# Patient Record
Sex: FEMALE
[De-identification: names, ages, dates, MRNs, and addresses within clinical notes are randomized per-mention and may not be internally consistent; named-entity substitution may affect disease eponyms.]

---

## 2024-09-13 ENCOUNTER — P2P PATIENT RECORD (OUTPATIENT)
Age: 44
End: 2024-09-13

## 2024-09-17 ENCOUNTER — TELEPHONE ENCOUNTER (OUTPATIENT)
Dept: URBAN - METROPOLITAN AREA CLINIC 60 | Facility: CLINIC | Age: 44
End: 2024-09-17

## 2024-09-26 ENCOUNTER — DASHBOARD ENCOUNTERS (OUTPATIENT)
Age: 44
End: 2024-09-26

## 2024-10-02 ENCOUNTER — LAB OUTSIDE AN ENCOUNTER (OUTPATIENT)
Dept: URBAN - METROPOLITAN AREA CLINIC 60 | Facility: CLINIC | Age: 44
End: 2024-10-02

## 2024-10-02 ENCOUNTER — OFFICE VISIT (OUTPATIENT)
Dept: URBAN - METROPOLITAN AREA CLINIC 60 | Facility: CLINIC | Age: 44
End: 2024-10-02
Payer: COMMERCIAL

## 2024-10-02 VITALS
RESPIRATION RATE: 12 BRPM | SYSTOLIC BLOOD PRESSURE: 140 MMHG | HEIGHT: 67 IN | OXYGEN SATURATION: 97 % | BODY MASS INDEX: 36.26 KG/M2 | TEMPERATURE: 98.7 F | WEIGHT: 231 LBS | DIASTOLIC BLOOD PRESSURE: 78 MMHG | HEART RATE: 126 BPM

## 2024-10-02 DIAGNOSIS — R79.89 ELEVATED LFTS: ICD-10-CM

## 2024-10-02 DIAGNOSIS — R19.7 ACUTE DIARRHEA: ICD-10-CM

## 2024-10-02 PROCEDURE — 99204 OFFICE O/P NEW MOD 45 MIN: CPT

## 2024-10-02 RX ORDER — HYDROCORTISONE 25 MG/G
APPLY TO AFFECTED AREA TWICE A DAY FOR 3 DAYS AS NEEDED CREAM TOPICAL
Qty: 28.35 GRAM | Refills: 0 | Status: ACTIVE | COMMUNITY

## 2024-10-02 RX ORDER — LISDEXAMFETAMINE DIMESYLATE 30 MG/1
TAKE 1 CAPSULE BY MOUTH EVERY DAY IN THE MORNING CAPSULE ORAL
Qty: 30 EACH | Refills: 0 | Status: ACTIVE | COMMUNITY

## 2024-10-02 RX ORDER — FLUOXETINE 20 MG/1
TAKE 1 CAPSULE BY MOUTH EVERY DAY IN THE MORNING FOR 90 DAYS CAPSULE ORAL
Qty: 90 EACH | Refills: 0 | Status: ACTIVE | COMMUNITY

## 2024-10-02 RX ORDER — GABAPENTIN 300 MG/1
TAKE 2 CAPSULES BY MOUTH AT BEDTIME CAPSULE ORAL
Qty: 60 EACH | Refills: 0 | Status: ACTIVE | COMMUNITY

## 2024-10-02 RX ORDER — NORETHINDRONE ACETATE AND ETHINYL ESTRADIOL 1; .02 MG/1; MG/1
TAKE 1 TABLET BY MOUTH EVERY DAY TABLET ORAL
Qty: 21 EACH | Refills: 0 | Status: ACTIVE | COMMUNITY

## 2024-10-02 RX ORDER — FERROUS SULFATE TAB EC 325 MG (65 MG FE EQUIVALENT) 325 (65 FE) MG
1 TABLET TABLET DELAYED RESPONSE ORAL
Qty: 12 | Status: ACTIVE | COMMUNITY
Start: 2024-10-02

## 2024-10-02 RX ORDER — ARIPIPRAZOLE 5 MG/1
TAKE 1 TABLET BY MOUTH EVERY DAY IN THE MORNING TABLET ORAL
Qty: 90 EACH | Refills: 0 | Status: ACTIVE | COMMUNITY

## 2024-10-02 RX ORDER — ROSUVASTATIN CALCIUM 20 MG/1
TAKE 1 TABLET BY MOUTH EVERYDAY AT BEDTIME TABLET, FILM COATED ORAL
Qty: 90 EACH | Refills: 1 | Status: ACTIVE | COMMUNITY

## 2024-10-02 RX ORDER — METOPROLOL SUCCINATE 50 MG/1
TAKE 1 TABLET BY MOUTH EVERY DAY TABLET, EXTENDED RELEASE ORAL
Qty: 90 EACH | Refills: 2 | Status: ACTIVE | COMMUNITY

## 2024-10-02 NOTE — HPI-TODAY'S VISIT:
Patient is a 44-year-old female who presents today as a new patient with complaints of diarrhea.  Patient states that for the last 6 weeks she has been experiencing daily diarrhea symptoms.  She states that when she wakes up she does have a formed bowel movement however throughout the day she has several bowel movements of watery diarrhea.  She states that she takes Imodium as needed with good symptom relief.  She denies abdominal pain, constipation, hematochezia and melena.  She did have some stool studies done through her PCP and the available results have been unremarkable so far.  Patient also states that she started Ozempic for weight loss and began experiencing acid reflux.  She ultimately discontinued the medication and her acid reflux has resolved.  On review of patient's most recent labs she did have elevated LFTs that seem to be trending downward.  She had an abdominal ultrasound done through her PCP which showed some fatty liver.  She has never had a colonoscopy before and denies known family history of colorectal cancer.  Patient denies fever, dysphagia, acid reflux, change in appetite, nausea, vomiting, hematemesis, change in stool frequency/caliber, unintentional weight loss/gain. . Labs 9/25/2024 - Lipid panel: HDL 45, triglycerides 212 otherwise unremarkable - CMP: AST 32 otherwise unremarkable - Hemoglobin A1c 5.8 - GGT 49 - CBCD within normal limits - Urinalysis within normal limits - Vitamin B12 and folate within normal limits - Hepatitis A antibody IgM nonreactive - Hepatitis B core antibody IgM nonreactive - Hepatitis C antibody nonreactive - Hepatitis B surface antigen nonreactive . Stool studies 9/25/2024 - C. difficile not detected - Aim for Bactrim as detected - Shiga toxin not detected - Salmonella and Shigella culture pending - O&& pending . Labs 8/12/2024 - TSH and free T4 within normal limits - CMP: AST 38, ALT 46 otherwise unremarkable - Hemoglobin A1c 5.7 - Vitamin D within normal limits . Labs 3/6/2024 - Lipid panel: HDL 40, triglycerides 184 otherwise unremarkable - CMP within normal limits - Vitamin B12 and folate within normal limits - CBCD: WBC 10.1, absolute lymphocytes 4.3 otherwise unremarkable

## 2024-11-13 ENCOUNTER — OFFICE VISIT (OUTPATIENT)
Dept: URBAN - METROPOLITAN AREA CLINIC 60 | Facility: CLINIC | Age: 44
End: 2024-11-13

## 2024-12-03 PROBLEM — 197321007: Status: ACTIVE | Noted: 2024-12-03

## 2024-12-04 ENCOUNTER — LAB OUTSIDE AN ENCOUNTER (OUTPATIENT)
Dept: URBAN - METROPOLITAN AREA CLINIC 60 | Facility: CLINIC | Age: 44
End: 2024-12-04

## 2024-12-04 ENCOUNTER — OFFICE VISIT (OUTPATIENT)
Dept: URBAN - METROPOLITAN AREA CLINIC 60 | Facility: CLINIC | Age: 44
End: 2024-12-04
Payer: COMMERCIAL

## 2024-12-04 VITALS
TEMPERATURE: 98.8 F | HEIGHT: 67 IN | OXYGEN SATURATION: 98 % | RESPIRATION RATE: 12 BRPM | SYSTOLIC BLOOD PRESSURE: 132 MMHG | WEIGHT: 239.4 LBS | HEART RATE: 112 BPM | BODY MASS INDEX: 37.57 KG/M2 | DIASTOLIC BLOOD PRESSURE: 80 MMHG

## 2024-12-04 DIAGNOSIS — K76.0 HEPATIC STEATOSIS: ICD-10-CM

## 2024-12-04 DIAGNOSIS — K52.9 CHRONIC DIARRHEA: ICD-10-CM

## 2024-12-04 PROCEDURE — 99214 OFFICE O/P EST MOD 30 MIN: CPT

## 2024-12-04 RX ORDER — METOPROLOL SUCCINATE 50 MG/1
TAKE 1 TABLET BY MOUTH EVERY DAY TABLET, EXTENDED RELEASE ORAL
Qty: 90 EACH | Refills: 2 | Status: ACTIVE | COMMUNITY

## 2024-12-04 RX ORDER — ROSUVASTATIN CALCIUM 20 MG/1
TAKE 1 TABLET BY MOUTH EVERYDAY AT BEDTIME TABLET, FILM COATED ORAL
Qty: 90 EACH | Refills: 1 | Status: ACTIVE | COMMUNITY

## 2024-12-04 RX ORDER — FLUOXETINE 20 MG/1
TAKE 1 CAPSULE BY MOUTH EVERY DAY IN THE MORNING FOR 90 DAYS CAPSULE ORAL
Qty: 90 EACH | Refills: 0 | Status: ACTIVE | COMMUNITY

## 2024-12-04 RX ORDER — ARIPIPRAZOLE 5 MG/1
TAKE 1 TABLET BY MOUTH EVERY DAY IN THE MORNING TABLET ORAL
Qty: 90 EACH | Refills: 0 | Status: ACTIVE | COMMUNITY

## 2024-12-04 RX ORDER — FERROUS SULFATE TAB EC 325 MG (65 MG FE EQUIVALENT) 325 (65 FE) MG
1 TABLET TABLET DELAYED RESPONSE ORAL
Qty: 12 | Status: ACTIVE | COMMUNITY
Start: 2024-10-02

## 2024-12-04 RX ORDER — HYDROCORTISONE 25 MG/G
APPLY TO AFFECTED AREA TWICE A DAY FOR 3 DAYS AS NEEDED CREAM TOPICAL
Qty: 28.35 GRAM | Refills: 0 | Status: ACTIVE | COMMUNITY

## 2024-12-04 RX ORDER — GABAPENTIN 300 MG/1
TAKE 2 CAPSULES BY MOUTH AT BEDTIME CAPSULE ORAL
Qty: 60 EACH | Refills: 0 | Status: ACTIVE | COMMUNITY

## 2024-12-04 RX ORDER — LISDEXAMFETAMINE DIMESYLATE 30 MG/1
TAKE 1 CAPSULE BY MOUTH EVERY DAY IN THE MORNING CAPSULE ORAL
Qty: 30 EACH | Refills: 0 | Status: ACTIVE | COMMUNITY

## 2024-12-04 RX ORDER — NORETHINDRONE ACETATE AND ETHINYL ESTRADIOL 1; .02 MG/1; MG/1
TAKE 1 TABLET BY MOUTH EVERY DAY TABLET ORAL
Qty: 21 EACH | Refills: 0 | Status: ACTIVE | COMMUNITY

## 2024-12-04 NOTE — HPI-TODAY'S VISIT:
Patient is a 44-year-old female who presents today for follow-up on labs and FibroScan results.  At today's visit patient states that her diarrhea symptoms are slightly better after starting Benefiber however sometimes she will still have watery diarrhea.  She is currently taking 3 teaspoons of Benefiber daily.  Reviewed stool studies with patient and discussed colonoscopy procedure to evaluate for microscopic colitis.  She does have a family history of colon cancer in her maternal grandmother.  Also discussed starting cholestyramine however patient would like to try increasing daily Benefiber and waiting for colonoscopy results before starting medication.  Also reviewed FibroScan and labs due to elevated LFTs which showed severe steatosis.  Discussed continued monitoring annually with repeat labs and FibroScan.  Patient denies fever, dysphagia, acid reflux, change in appetite, nausea, vomiting, hematemesis, change in stool frequency/caliber, unintentional weight loss/gain. She also denies issues with anesthesia, history of stroke, heart attack, pacemaker, defibrillator, stents, blood thinners, COPD, asthma, DREW, chronic kidney disease and seizures. . FibroScan 11/13/2024 - Severe steatosis (S3) - No evidence of clinically significant fibrosis (F0) . Stool studies 11/6/2024 - Pancreatic elastase within normal limits - Fecal calprotectin within normal limits . Labs 10/31/2024 - Iron panel within normal limits - Ceruloplasmin within normal limits - AFP within normal limits - Ferritin within normal limits - Alpha 1 antitrypsin normal - JOHN negative - Hemochromatosis negative . Labs 9/25/2024 - Lipid panel: HDL 45, triglycerides 212 otherwise unremarkable - CMP: AST 32 otherwise unremarkable - Hemoglobin A1c 5.8 - GGT 49 - CBCD within normal limits - Urinalysis within normal limits - Vitamin B12 and folate within normal limits - Hepatitis A antibody IgM nonreactive - Hepatitis B core antibody IgM nonreactive - Hepatitis C antibody nonreactive - Hepatitis B surface antigen nonreactive . Stool studies 9/25/2024 - C. difficile not detected - Aim for Bactrim as detected - Shiga toxin not detected - Salmonella and Shigella culture pending - O&P pending . Labs 8/12/2024 - TSH and free T4 within normal limits - CMP: AST 38, ALT 46 otherwise unremarkable - Hemoglobin A1c 5.7 - Vitamin D within normal limits . Labs 3/6/2024 - Lipid panel: HDL 40, triglycerides 184 otherwise unremarkable - CMP within normal limits - Vitamin B12 and folate within normal limits - CBCD: WBC 10.1, absolute lymphocytes 4.3 otherwise unremarkable

## 2025-02-21 ENCOUNTER — TELEPHONE ENCOUNTER (OUTPATIENT)
Dept: URBAN - METROPOLITAN AREA CLINIC 63 | Facility: CLINIC | Age: 45
End: 2025-02-21

## 2025-03-06 ENCOUNTER — OFFICE VISIT (OUTPATIENT)
Dept: URBAN - METROPOLITAN AREA SURGERY CENTER 4 | Facility: SURGERY CENTER | Age: 45
End: 2025-03-06
Payer: COMMERCIAL

## 2025-03-06 ENCOUNTER — CLAIMS CREATED FROM THE CLAIM WINDOW (OUTPATIENT)
Dept: URBAN - METROPOLITAN AREA CLINIC 4 | Facility: CLINIC | Age: 45
End: 2025-03-06
Payer: COMMERCIAL

## 2025-03-06 DIAGNOSIS — K52.9 NONINFECTIOUS GASTROENTERITIS, UNSPECIFIED TYPE: ICD-10-CM

## 2025-03-06 DIAGNOSIS — R19.7 DIARRHEA, UNSPECIFIED TYPE: ICD-10-CM

## 2025-03-06 DIAGNOSIS — K64.0 FIRST DEGREE HEMORRHOIDS: ICD-10-CM

## 2025-03-06 DIAGNOSIS — K63.89 OTHER SPECIFIED DISEASES OF INTESTINE: ICD-10-CM

## 2025-03-06 PROCEDURE — 45380 COLONOSCOPY AND BIOPSY: CPT | Performed by: INTERNAL MEDICINE

## 2025-03-06 PROCEDURE — 88313 SPECIAL STAINS GROUP 2: CPT | Performed by: PATHOLOGY

## 2025-03-06 PROCEDURE — 88305 TISSUE EXAM BY PATHOLOGIST: CPT | Performed by: PATHOLOGY

## 2025-03-06 PROCEDURE — 88342 IMHCHEM/IMCYTCHM 1ST ANTB: CPT | Performed by: PATHOLOGY

## 2025-03-06 PROCEDURE — 00811 ANES LWR INTST NDSC NOS: CPT | Performed by: NURSE ANESTHETIST, CERTIFIED REGISTERED

## 2025-03-06 RX ORDER — METOPROLOL SUCCINATE 50 MG/1
TAKE 1 TABLET BY MOUTH EVERY DAY TABLET, EXTENDED RELEASE ORAL
Qty: 90 EACH | Refills: 2 | Status: ACTIVE | COMMUNITY

## 2025-03-06 RX ORDER — HYDROCORTISONE 25 MG/G
APPLY TO AFFECTED AREA TWICE A DAY FOR 3 DAYS AS NEEDED CREAM TOPICAL
Qty: 28.35 GRAM | Refills: 0 | Status: ACTIVE | COMMUNITY

## 2025-03-06 RX ORDER — LISDEXAMFETAMINE DIMESYLATE 30 MG/1
TAKE 1 CAPSULE BY MOUTH EVERY DAY IN THE MORNING CAPSULE ORAL
Qty: 30 EACH | Refills: 0 | Status: ACTIVE | COMMUNITY

## 2025-03-06 RX ORDER — ARIPIPRAZOLE 5 MG/1
TAKE 1 TABLET BY MOUTH EVERY DAY IN THE MORNING TABLET ORAL
Qty: 90 EACH | Refills: 0 | Status: ACTIVE | COMMUNITY

## 2025-03-06 RX ORDER — NORETHINDRONE ACETATE AND ETHINYL ESTRADIOL 1; .02 MG/1; MG/1
TAKE 1 TABLET BY MOUTH EVERY DAY TABLET ORAL
Qty: 21 EACH | Refills: 0 | Status: ACTIVE | COMMUNITY

## 2025-03-06 RX ORDER — FLUOXETINE 20 MG/1
TAKE 1 CAPSULE BY MOUTH EVERY DAY IN THE MORNING FOR 90 DAYS CAPSULE ORAL
Qty: 90 EACH | Refills: 0 | Status: ACTIVE | COMMUNITY

## 2025-03-06 RX ORDER — ROSUVASTATIN CALCIUM 20 MG/1
TAKE 1 TABLET BY MOUTH EVERYDAY AT BEDTIME TABLET, FILM COATED ORAL
Qty: 90 EACH | Refills: 1 | Status: ACTIVE | COMMUNITY

## 2025-03-06 RX ORDER — GABAPENTIN 300 MG/1
TAKE 2 CAPSULES BY MOUTH AT BEDTIME CAPSULE ORAL
Qty: 60 EACH | Refills: 0 | Status: ACTIVE | COMMUNITY

## 2025-03-06 RX ORDER — FERROUS SULFATE TAB EC 325 MG (65 MG FE EQUIVALENT) 325 (65 FE) MG
1 TABLET TABLET DELAYED RESPONSE ORAL
Qty: 12 | Status: ACTIVE | COMMUNITY
Start: 2024-10-02

## 2025-03-13 ENCOUNTER — TELEPHONE ENCOUNTER (OUTPATIENT)
Dept: URBAN - METROPOLITAN AREA CLINIC 63 | Facility: CLINIC | Age: 45
End: 2025-03-13

## 2025-03-14 ENCOUNTER — OFFICE VISIT (OUTPATIENT)
Dept: URBAN - METROPOLITAN AREA CLINIC 60 | Facility: CLINIC | Age: 45
End: 2025-03-14
Payer: COMMERCIAL

## 2025-03-14 VITALS
HEART RATE: 93 BPM | DIASTOLIC BLOOD PRESSURE: 90 MMHG | BODY MASS INDEX: 39.08 KG/M2 | OXYGEN SATURATION: 97 % | TEMPERATURE: 98.7 F | WEIGHT: 249 LBS | SYSTOLIC BLOOD PRESSURE: 130 MMHG | HEIGHT: 67 IN

## 2025-03-14 DIAGNOSIS — K76.0 HEPATIC STEATOSIS: ICD-10-CM

## 2025-03-14 DIAGNOSIS — K52.9 CHRONIC DIARRHEA: ICD-10-CM

## 2025-03-14 PROCEDURE — 99214 OFFICE O/P EST MOD 30 MIN: CPT

## 2025-03-14 RX ORDER — METOPROLOL SUCCINATE 50 MG/1
TAKE 1 TABLET BY MOUTH EVERY DAY TABLET, EXTENDED RELEASE ORAL
Qty: 90 EACH | Refills: 2 | Status: ACTIVE | COMMUNITY

## 2025-03-14 RX ORDER — ROSUVASTATIN CALCIUM 20 MG/1
TAKE 1 TABLET BY MOUTH EVERYDAY AT BEDTIME TABLET, FILM COATED ORAL
Qty: 90 EACH | Refills: 1 | Status: ACTIVE | COMMUNITY

## 2025-03-14 RX ORDER — HYDROCORTISONE 25 MG/G
APPLY TO AFFECTED AREA TWICE A DAY FOR 3 DAYS AS NEEDED CREAM TOPICAL
Qty: 28.35 GRAM | Refills: 0 | Status: ACTIVE | COMMUNITY

## 2025-03-14 RX ORDER — CHOLESTYRAMINE 4 G/9G
1 PACKET MIXED WITH WATER OR NON-CARBONATED DRINK POWDER, FOR SUSPENSION ORAL ONCE A DAY
Qty: 30 | Refills: 1 | OUTPATIENT
Start: 2025-03-14

## 2025-03-14 RX ORDER — LISDEXAMFETAMINE DIMESYLATE 30 MG/1
TAKE 1 CAPSULE BY MOUTH EVERY DAY IN THE MORNING CAPSULE ORAL
Qty: 30 EACH | Refills: 0 | Status: ACTIVE | COMMUNITY

## 2025-03-14 RX ORDER — NORETHINDRONE ACETATE AND ETHINYL ESTRADIOL 1; .02 MG/1; MG/1
TAKE 1 TABLET BY MOUTH EVERY DAY TABLET ORAL
Qty: 21 EACH | Refills: 0 | Status: ACTIVE | COMMUNITY

## 2025-03-14 RX ORDER — GABAPENTIN 300 MG/1
TAKE 2 CAPSULES BY MOUTH AT BEDTIME CAPSULE ORAL
Qty: 60 EACH | Refills: 0 | Status: ACTIVE | COMMUNITY

## 2025-03-14 RX ORDER — FERROUS SULFATE TAB EC 325 MG (65 MG FE EQUIVALENT) 325 (65 FE) MG
1 TABLET TABLET DELAYED RESPONSE ORAL
Qty: 12 | Status: ACTIVE | COMMUNITY
Start: 2024-10-02

## 2025-03-14 RX ORDER — ARIPIPRAZOLE 5 MG/1
TAKE 1 TABLET BY MOUTH EVERY DAY IN THE MORNING TABLET ORAL
Qty: 90 EACH | Refills: 0 | Status: ACTIVE | COMMUNITY

## 2025-03-14 RX ORDER — FLUOXETINE 20 MG/1
TAKE 1 CAPSULE BY MOUTH EVERY DAY IN THE MORNING FOR 90 DAYS CAPSULE ORAL
Qty: 90 EACH | Refills: 0 | Status: ACTIVE | COMMUNITY

## 2025-03-14 NOTE — HPI-TODAY'S VISIT:
Patient is a 44-year-old female with a past medical history of hepatic steatosis who presents today for review of colonoscopy results.  At today's visit patient states that her diarrhea symptoms have improved with increasing Benefiber to 3 teaspoons twice a day.  She does still have soft bowel movements and has to have 2-3 bowel movements daily but typically the bowel movement in the morning is more formed.  She has not experienced any abdominal bloating with this.  Reviewed colonoscopy results.  Discussed with patient cholestyramine trial which she is open to. Patient denies fever, dysphagia, acid reflux, change in appetite, nausea, vomiting, hematemesis, change in stool frequency/caliber, unintentional weight loss/gain. . Colonoscopy 3/6/2025 - Internal hemorrhoids - Examination was otherwise normal - Examined portion of ileum was normal, biopsied - Biopsies taken from entire colon for evaluation of microscopic colitis - Pathology: Terminal ileum biopsy-no significant abnormality; random colon biopsy-no significant abnormality - Repeat colonoscopy in 10 years . FibroScan 11/13/2024 - Severe steatosis (S3) - No evidence of clinically significant fibrosis (F0) . Stool studies 11/6/2024 - Pancreatic elastase within normal limits - Fecal calprotectin within normal limits . Labs 10/31/2024 - Iron panel within normal limits - Ceruloplasmin within normal limits - AFP within normal limits - Ferritin within normal limits - Alpha 1 antitrypsin normal - JOHN negative - Hemochromatosis negative . Labs 9/25/2024 - Lipid panel: HDL 45, triglycerides 212 otherwise unremarkable - CMP: AST 32 otherwise unremarkable - Hemoglobin A1c 5.8 - GGT 49 - CBCD within normal limits - Urinalysis within normal limits - Vitamin B12 and folate within normal limits - Hepatitis A antibody IgM nonreactive - Hepatitis B core antibody IgM nonreactive - Hepatitis C antibody nonreactive - Hepatitis B surface antigen nonreactive . Stool studies 9/25/2024 - C. difficile not detected - Aim for Bactrim as detected - Shiga toxin not detected - Salmonella and Shigella culture pending - O&P pending . Labs 8/12/2024 - TSH and free T4 within normal limits - CMP: AST 38, ALT 46 otherwise unremarkable - Hemoglobin A1c 5.7 - Vitamin D within normal limits . Labs 3/6/2024 - Lipid panel: HDL 40, triglycerides 184 otherwise unremarkable - CMP within normal limits - Vitamin B12 and folate within normal limits - CBCD: WBC 10.1, absolute lymphocytes 4.3 otherwise unremarkable

## 2025-03-19 ENCOUNTER — OFFICE VISIT (OUTPATIENT)
Dept: URBAN - METROPOLITAN AREA CLINIC 60 | Facility: CLINIC | Age: 45
End: 2025-03-19

## 2025-04-23 ENCOUNTER — OFFICE VISIT (OUTPATIENT)
Dept: URBAN - METROPOLITAN AREA CLINIC 60 | Facility: CLINIC | Age: 45
End: 2025-04-23
Payer: COMMERCIAL

## 2025-04-23 ENCOUNTER — LAB OUTSIDE AN ENCOUNTER (OUTPATIENT)
Dept: URBAN - METROPOLITAN AREA CLINIC 60 | Facility: CLINIC | Age: 45
End: 2025-04-23

## 2025-04-23 DIAGNOSIS — K76.0 HEPATIC STEATOSIS: ICD-10-CM

## 2025-04-23 DIAGNOSIS — K52.9 CHRONIC DIARRHEA: ICD-10-CM

## 2025-04-23 PROCEDURE — 99214 OFFICE O/P EST MOD 30 MIN: CPT

## 2025-04-23 RX ORDER — LISDEXAMFETAMINE DIMESYLATE 30 MG/1
TAKE 1 CAPSULE BY MOUTH EVERY DAY IN THE MORNING CAPSULE ORAL
Qty: 30 EACH | Refills: 0 | Status: ACTIVE | COMMUNITY

## 2025-04-23 RX ORDER — NORETHINDRONE ACETATE AND ETHINYL ESTRADIOL 1; .02 MG/1; MG/1
TAKE 1 TABLET BY MOUTH EVERY DAY TABLET ORAL
Qty: 21 EACH | Refills: 0 | Status: ACTIVE | COMMUNITY

## 2025-04-23 RX ORDER — FLUOXETINE 20 MG/1
TAKE 1 CAPSULE BY MOUTH EVERY DAY IN THE MORNING FOR 90 DAYS CAPSULE ORAL
Qty: 90 EACH | Refills: 0 | Status: ACTIVE | COMMUNITY

## 2025-04-23 RX ORDER — ROSUVASTATIN CALCIUM 20 MG/1
TAKE 1 TABLET BY MOUTH EVERYDAY AT BEDTIME TABLET, FILM COATED ORAL
Qty: 90 EACH | Refills: 1 | Status: ACTIVE | COMMUNITY

## 2025-04-23 RX ORDER — CHOLESTYRAMINE 4 G/9G
1 PACKET MIXED WITH WATER OR NON-CARBONATED DRINK POWDER, FOR SUSPENSION ORAL ONCE A DAY
Qty: 90 | Refills: 3
Start: 2025-03-14

## 2025-04-23 RX ORDER — FERROUS SULFATE TAB EC 325 MG (65 MG FE EQUIVALENT) 325 (65 FE) MG
1 TABLET TABLET DELAYED RESPONSE ORAL
Qty: 12 | Status: ACTIVE | COMMUNITY
Start: 2024-10-02

## 2025-04-23 RX ORDER — ARIPIPRAZOLE 5 MG/1
TAKE 1 TABLET BY MOUTH EVERY DAY IN THE MORNING TABLET ORAL
Qty: 90 EACH | Refills: 0 | Status: ACTIVE | COMMUNITY

## 2025-04-23 RX ORDER — METOPROLOL SUCCINATE 50 MG/1
TAKE 1 TABLET BY MOUTH EVERY DAY TABLET, EXTENDED RELEASE ORAL
Qty: 90 EACH | Refills: 2 | Status: ACTIVE | COMMUNITY

## 2025-04-23 RX ORDER — CHOLESTYRAMINE 4 G/9G
1 PACKET MIXED WITH WATER OR NON-CARBONATED DRINK POWDER, FOR SUSPENSION ORAL ONCE A DAY
Qty: 30 | Refills: 1 | Status: ACTIVE | COMMUNITY
Start: 2025-03-14

## 2025-04-23 RX ORDER — HYDROCORTISONE 25 MG/G
APPLY TO AFFECTED AREA TWICE A DAY FOR 3 DAYS AS NEEDED CREAM TOPICAL
Qty: 28.35 GRAM | Refills: 0 | Status: ACTIVE | COMMUNITY

## 2025-04-23 RX ORDER — GABAPENTIN 300 MG/1
TAKE 2 CAPSULES BY MOUTH AT BEDTIME CAPSULE ORAL
Qty: 60 EACH | Refills: 0 | Status: ACTIVE | COMMUNITY

## 2025-04-23 NOTE — HPI-TODAY'S VISIT:
Patient is a 44-year-old female with a past medical history of hepatic steatosis who presents today for follow-up on diarrhea symptoms.  At today's visit patient states that her diarrhea is under good control when using cholestyramine however she does admit that she will sometimes forget to take the medication and have a reoccurrence of symptoms.  She also continues to take Benefiber daily.  Reviewed celiac panel results which were negative.  Patient is concerned about her gallbladder due to her diarrhea however does not have any RUQ abdominal pain, nausea or vomiting.  Patient is also due for repeat FibroScan and labs in November.  Patient denies fever, dysphagia, acid reflux, change in appetite, hematemesis, change in stool frequency/caliber, unintentional weight loss/gain. . Colonoscopy 3/6/2025 - Internal hemorrhoids - Examination was otherwise normal - Examined portion of ileum was normal, biopsied - Biopsies taken from entire colon for evaluation of microscopic colitis - Pathology: Terminal ileum biopsy-no significant abnormality; random colon biopsy-no significant abnormality - Repeat colonoscopy in 10 years . FibroScan 11/13/2024 - Severe steatosis (S3) - No evidence of clinically significant fibrosis (F0) . Labs 4/15/2025 - Celiac disease panel negative . Stool studies 11/6/2024 - Pancreatic elastase within normal limits - Fecal calprotectin within normal limits . Labs 10/31/2024 - Iron panel within normal limits - Ceruloplasmin within normal limits - AFP within normal limits - Ferritin within normal limits - Alpha 1 antitrypsin normal - JOHN negative - Hemochromatosis negative . Labs 9/25/2024 - Lipid panel: HDL 45, triglycerides 212 otherwise unremarkable - CMP: AST 32 otherwise unremarkable - Hemoglobin A1c 5.8 - GGT 49 - CBCD within normal limits - Urinalysis within normal limits - Vitamin B12 and folate within normal limits - Hepatitis A antibody IgM nonreactive - Hepatitis B core antibody IgM nonreactive - Hepatitis C antibody nonreactive - Hepatitis B surface antigen nonreactive . Stool studies 9/25/2024 - C. difficile not detected - Aim for Bactrim as detected - Shiga toxin not detected - Salmonella and Shigella culture pending - O&P pending . Labs 8/12/2024 - TSH and free T4 within normal limits - CMP: AST 38, ALT 46 otherwise unremarkable - Hemoglobin A1c 5.7 - Vitamin D within normal limits . Labs 3/6/2024 - Lipid panel: HDL 40, triglycerides 184 otherwise unremarkable - CMP within normal limits - Vitamin B12 and folate within normal limits - CBCD: WBC 10.1, absolute lymphocytes 4.3 otherwise unremarkable